# Patient Record
(demographics unavailable — no encounter records)

---

## 2024-10-13 NOTE — HISTORY OF PRESENT ILLNESS
[de-identified] : 10/11/2024 Translated by Sarkis   Sherley Stanley presents to the office for follow-up of her right total knee arthroplasty.  Patient kneeled on her knee and had right knee pain.  No falls.  No fevers or chills.  5/31/2024 Translated by Sarkis  Sherley Stanley is a 91-year-old female presents to the office for evaluation of her bilateral total knee arthroplasties.  Patient underwent bilateral TKAs in 2009 and 2015 at [facility].  She also underwent a humerus ORIF by Dr. Hopper.  Patient had a fall about 2 days ago.  She was walking and fell onto the knees.  Pain is located over the anterior knees.  She has tried Tylenol.  Patient walks with a walker.  No fevers or chills.  No reported head strike.  History: Pre-Diabetes, Asthma

## 2024-10-13 NOTE — DISCUSSION/SUMMARY
[de-identified] : Sherley Stanley is a 92-year-old female who presents to the office, with her son, for evaluation of her bilateral total knee arthroplasties.  Patient kneeled on her right knee. Prior X-rays showed no obvious fracture dislocations.  Examination showed tenderness over the knee, but otherwise good knee range of motion.  Discussed with patient and her son the examination and imaging findings.  Discussed with the patient the management of her pain at this time, including home exercises and anti-inflammatories.  Patient will continue with her home exercises.  She will take over-the-counter anti-inflammatories as needed for pain control.  Patient will follow-up in 6 months for reevaluation and management.  Patient and her son understanding and in agreement with the plan.  All questions answered.  Plan: -Home exercises -Over-the-counter anti-inflammatories as needed for pain control -Follow-up in 6 months for reevaluation and management

## 2024-10-13 NOTE — DISCUSSION/SUMMARY
[de-identified] : Sherley Stanley is a 92-year-old female who presents to the office, with her son, for evaluation of her bilateral total knee arthroplasties.  Patient kneeled on her right knee. Prior X-rays showed no obvious fracture dislocations.  Examination showed tenderness over the knee, but otherwise good knee range of motion.  Discussed with patient and her son the examination and imaging findings.  Discussed with the patient the management of her pain at this time, including home exercises and anti-inflammatories.  Patient will continue with her home exercises.  She will take over-the-counter anti-inflammatories as needed for pain control.  Patient will follow-up in 6 months for reevaluation and management.  Patient and her son understanding and in agreement with the plan.  All questions answered.  Plan: -Home exercises -Over-the-counter anti-inflammatories as needed for pain control -Follow-up in 6 months for reevaluation and management

## 2024-10-13 NOTE — HISTORY OF PRESENT ILLNESS
[de-identified] : 10/11/2024 Translated by Sarkis   Sherley Stanley presents to the office for follow-up of her right total knee arthroplasty.  Patient kneeled on her knee and had right knee pain.  No falls.  No fevers or chills.  5/31/2024 Translated by Sarkis  Sherley Stanley is a 91-year-old female presents to the office for evaluation of her bilateral total knee arthroplasties.  Patient underwent bilateral TKAs in 2009 and 2015 at [facility].  She also underwent a humerus ORIF by Dr. Hopper.  Patient had a fall about 2 days ago.  She was walking and fell onto the knees.  Pain is located over the anterior knees.  She has tried Tylenol.  Patient walks with a walker.  No fevers or chills.  No reported head strike.  History: Pre-Diabetes, Asthma

## 2024-10-13 NOTE — PHYSICAL EXAM
[de-identified] : Constitutional:  91 year old female, alert and oriented, cooperative, in no acute distress.  HEENT  NC/AT.  Appearance: symmetric  Chest/Respiratory  Respiratory effort: no intercostal retractions or use of accessory muscles. Nonlabored Breathing  Mental Status:  Judgment, insight: intact Orientation: oriented to time, place, and person  Right Knee  Inspection:     Incision well healed. No erythema or drainage     No Effusion     Non-tender to palpation over tibial tubercle, medial and lateral joint line, and pes insertion.    Tenderness over the anterior knee  Range of Motion: 	Extension - 0 degrees 	Flexion - 110 degrees 	Extensor lag: None  Stability:      Demonstrates no Varus or Valgus instability      Negative Anterior or Posterior drawer.      No midflexion instability  Patella: stable, tracks well.   Neurologic Exam     Motor intact including 5/5 Extensor Hallucis Longus, 5/5 Flexor Hallucis Longus, 5/5 Tibialis Anterior and 5/5 Gastrocnemius     Sensation Intact to Light Touch including Saphenous, Sural, Superficial Peroneal, Deep Peroneal, Tibial nerve distributions  Vascular Exam     Foot is warm and well perfused with 2+ Dorsalis Pedis Pulse   No pain with range of motion of the bilateral hips. No lumbar paraspinal muscle tenderness. [de-identified] : XRay:  XRays of the Pelvis (1 View) taken on 5/31/2024. XRays demonstrate no obvious fracture or dislocation. There is no significant evidence of osteoarthritis or osteophyte formation. (my personal interpretation).   XRay:  XRays of the Right Knee (3 Views) taken in the office today and reviewed with the patient. XRays demonstrate a Right Total Knee Arthroplasty in good position and alignment. There is no obvious evidence of fracture, dislocation, osteolysis or loosening. (my personal interpretation)  XRay:  XRays of the Left Knee (3 Views) taken on 5/31/2024. XRays demonstrate a Left Total Knee Arthroplasty in good position and alignment. There is no obvious evidence of fracture, dislocation, osteolysis or loosening. (my personal interpretation)

## 2024-10-13 NOTE — PHYSICAL EXAM
[de-identified] : Constitutional:  91 year old female, alert and oriented, cooperative, in no acute distress.  HEENT  NC/AT.  Appearance: symmetric  Chest/Respiratory  Respiratory effort: no intercostal retractions or use of accessory muscles. Nonlabored Breathing  Mental Status:  Judgment, insight: intact Orientation: oriented to time, place, and person  Right Knee  Inspection:     Incision well healed. No erythema or drainage     No Effusion     Non-tender to palpation over tibial tubercle, medial and lateral joint line, and pes insertion.    Tenderness over the anterior knee  Range of Motion: 	Extension - 0 degrees 	Flexion - 110 degrees 	Extensor lag: None  Stability:      Demonstrates no Varus or Valgus instability      Negative Anterior or Posterior drawer.      No midflexion instability  Patella: stable, tracks well.   Neurologic Exam     Motor intact including 5/5 Extensor Hallucis Longus, 5/5 Flexor Hallucis Longus, 5/5 Tibialis Anterior and 5/5 Gastrocnemius     Sensation Intact to Light Touch including Saphenous, Sural, Superficial Peroneal, Deep Peroneal, Tibial nerve distributions  Vascular Exam     Foot is warm and well perfused with 2+ Dorsalis Pedis Pulse   No pain with range of motion of the bilateral hips. No lumbar paraspinal muscle tenderness. [de-identified] : XRay:  XRays of the Pelvis (1 View) taken on 5/31/2024. XRays demonstrate no obvious fracture or dislocation. There is no significant evidence of osteoarthritis or osteophyte formation. (my personal interpretation).   XRay:  XRays of the Right Knee (3 Views) taken in the office today and reviewed with the patient. XRays demonstrate a Right Total Knee Arthroplasty in good position and alignment. There is no obvious evidence of fracture, dislocation, osteolysis or loosening. (my personal interpretation)  XRay:  XRays of the Left Knee (3 Views) taken on 5/31/2024. XRays demonstrate a Left Total Knee Arthroplasty in good position and alignment. There is no obvious evidence of fracture, dislocation, osteolysis or loosening. (my personal interpretation)

## 2025-03-28 NOTE — REVIEW OF SYSTEMS
[As Noted in HPI] : as noted in HPI [Abdominal Pain] : abdominal pain [Negative] : Heme/Lymph [Constipation] : constipation [Diarrhea] : diarrhea

## 2025-03-28 NOTE — PHYSICAL EXAM
[Alert] : alert [Normal Voice/Communication] : normal voice/communication [No Acute Distress] : no acute distress [Sclera] : the sclera and conjunctiva were normal [Hearing Threshold Finger Rub Not [place]] : hearing was normal [Normal Lips/Gums] : the lips and gums were normal [Oropharynx] : the oropharynx was normal [Normal Appearance] : the appearance of the neck was normal [No Neck Mass] : no neck mass was observed [No Respiratory Distress] : no respiratory distress [No Acc Muscle Use] : no accessory muscle use [Respiration, Rhythm And Depth] : normal respiratory rhythm and effort [Auscultation Breath Sounds / Voice Sounds] : lungs were clear to auscultation bilaterally [Heart Rate And Rhythm] : heart rate was normal and rhythm regular [Normal S1, S2] : normal S1 and S2 [Murmurs] : no murmurs [Abdomen Tenderness] : non-tender [No Masses] : no abdominal mass palpated [Abdomen Soft] : soft [] : no hepatosplenomegaly [Epigastric] : epigastric [Oriented To Time, Place, And Person] : oriented to person, place, and time [de-identified] : distended

## 2025-03-28 NOTE — REASON FOR VISIT
[Acute] : an acute visit [Family Member] : family member [FreeTextEntry1] : irregular BMs and abdominal pain

## 2025-03-28 NOTE — HISTORY OF PRESENT ILLNESS
[FreeTextEntry1] : 11/2023: 90 yo female, hx of [place], rectocele , nausea, vertigo. No weight loss, Recent epigastric pain. Had colonoscopy 2015, results not konwn. Hx of [place]omie ori tear.Has been on Fosamax for 5 years , rx by Domitila Kennedy MD. Pt has regular bms, denies black stools. Was advised by Dr. Joyner to go to [facility] ER, but son did not wish to have her there as mother is fearful. No fever or chills.  RTO 3/28/25 complaining of irregular BMs and abdominal pain. Son noted at bedside. Patient complains of irregular BMs, alternates diarrhea/constipation. Patient is concerned that she has parasites, states that she sees them in her stool. Currently taking pantoprazole and famotidine for GERD. Complains of intermittent abdominal discomfort. Patient strains with BMs, experiences rectal discomfort due to hemorrhoids. Takes milk of magnesia and colace for constipation. Denies sob, cp or fevers. Will order stool kit and CT abdomen/pelvis w/IV contrast.

## 2025-03-28 NOTE — ASSESSMENT
[FreeTextEntry1] : 11/2023: 90 yo female, hx of [place], rectocele , nausea, vertigo. No weight loss, Recent epigastric pain. Had colonoscopy 2015, results not konwn. Hx of Naomie ori tear.Has been on Fosamax for 5 years , rx by Domitila Kennedy MD. Pt has regular bms, denies black stools. Was advised by Dr. Joyner to go to [facility] ER, but son did not wish to have her there as mother is fearful. No fever or chills.  RTO 3/28/25 complaining of irregular BMs and abdominal pain. Son noted at bedside. Patient complains of irregular BMs, alternates diarrhea/constipation. Patient is concerned that she has parasites, states that she sees them in her stool. Currently taking pantoprazole and famotidine for GERD. Complains of intermittent abdominal discomfort. Patient strains with BMs, experiences rectal discomfort due to hemorrhoids. Takes milk of magnesia and colace for constipation. Denies sob, cp or fevers. Will order stool kit and CT abdomen/pelvis w/IV contrast.  Plan: 1. Ordered HC 2.5% rectal cream 2. Ordered CT abdomen/pelvis with IV contrast 3. Ordered stool tests. 4. Follow up results.